# Patient Record
Sex: MALE | Race: WHITE | NOT HISPANIC OR LATINO | URBAN - METROPOLITAN AREA
[De-identification: names, ages, dates, MRNs, and addresses within clinical notes are randomized per-mention and may not be internally consistent; named-entity substitution may affect disease eponyms.]

---

## 2017-08-23 ENCOUNTER — EMERGENCY (EMERGENCY)
Facility: HOSPITAL | Age: 42
LOS: 0 days | Discharge: HOME | End: 2017-08-23
Attending: EMERGENCY MEDICINE | Admitting: EMERGENCY MEDICINE
Payer: SELF-PAY

## 2017-08-23 VITALS — WEIGHT: 195.11 LBS | HEIGHT: 73 IN

## 2017-08-23 VITALS — DIASTOLIC BLOOD PRESSURE: 74 MMHG | SYSTOLIC BLOOD PRESSURE: 121 MMHG

## 2017-08-23 DIAGNOSIS — L02.512 CUTANEOUS ABSCESS OF LEFT HAND: ICD-10-CM

## 2017-08-23 DIAGNOSIS — M79.644 PAIN IN RIGHT FINGER(S): ICD-10-CM

## 2017-08-23 DIAGNOSIS — M79.641 PAIN IN RIGHT HAND: ICD-10-CM

## 2017-08-23 PROCEDURE — 26010 DRAINAGE OF FINGER ABSCESS: CPT | Mod: F1

## 2017-08-23 PROCEDURE — 99283 EMERGENCY DEPT VISIT LOW MDM: CPT

## 2017-08-23 PROCEDURE — 99053 MED SERV 10PM-8AM 24 HR FAC: CPT

## 2017-08-23 RX ORDER — AZTREONAM 2 G
1 VIAL (EA) INJECTION
Qty: 14 | Refills: 0 | OUTPATIENT
Start: 2017-08-23 | End: 2017-08-30

## 2017-08-23 RX ORDER — CEPHALEXIN 500 MG
1 CAPSULE ORAL
Qty: 21 | Refills: 0 | OUTPATIENT
Start: 2017-08-23 | End: 2017-08-30

## 2017-08-23 RX ORDER — CEPHALEXIN 500 MG
500 CAPSULE ORAL EVERY 6 HOURS
Qty: 0 | Refills: 0 | Status: DISCONTINUED | OUTPATIENT
Start: 2017-08-23 | End: 2017-08-23

## 2017-08-23 RX ADMIN — Medication 1 TABLET(S): at 20:53

## 2017-08-23 RX ADMIN — Medication 500 MILLIGRAM(S): at 20:53

## 2017-08-23 NOTE — CONSULT NOTE ADULT - SUBJECTIVE AND OBJECTIVE BOX
42y Male community ambulatory presents c/o Left 2nd digit abscess. The patient is right hand dominant. The patient reports that he hit his left 2nd digit with a hammer last week and had some redness develop over the area which began to drain 3 days ago. Denies HS/LOC. Denies numbness/tingling. No other pain/injuries. Denies fevers/chills. The patient denies any history of MRSA.       HEALTH ISSUES - PROBLEM Dx:  psoriasis,       MEDICATIONS  (STANDING):  cephalexin 500 milliGRAM(s) Oral every 6 hours    Allergies    No Known Allergies    Intolerances    Vital Signs Last 24 Hrs  T(C): 36.8 (08-23-17 @ 20:13), Max: 36.8 (08-23-17 @ 20:13)  T(F): 98.2 (08-23-17 @ 20:13), Max: 98.2 (08-23-17 @ 20:13)  HR: 96 (08-23-17 @ 20:13) (96 - 96)  BP: 121/74 (08-23-17 @ 21:55) (121/74 - 146/82)  RR: 19 (08-23-17 @ 20:13) (19 - 19)  SpO2: 98% (08-23-17 @ 20:13) (98% - 98%)    Physical Exam  Gen: NAD  LUE: Warm/well perfused, at Dorsal PIP area of the 2nd finger there is a 5xlg5lo area of fluctuance with central eschar and small amount of purulent drainage noted. Patient sensation intact to finger tip with intact flexion/extension at DIP and PIP, FDS/FDP intact.      Procedure: L hand was prepped and draped in sterile fashion, 10cc of 1% lidocaine was used to perform a metacarpal digit block of the L 2nd finger. Abscess was incised, drained and washed out with sterile saline, a penrose drain was left in place. 1cc of purulent fluid was expressed. The wound was dressed with a sterile dressing. The patient tolerated the procedure well. NVI post-procedure.

## 2017-08-23 NOTE — ED STATDOCS - PHYSICAL EXAMINATION
GEN: AOX3, NAD. HEENT: Throat clear. Head NC/AT. NECK: Supple, No JVD. FROM. C-spine non-tender. CV:S1S2, RRR, LUNGS: CTA/b/l, no w/r/r. ABD: Soft, NT/ND, no rebound, no guarding. No CVAT. EXT: No e/c/c. 2+ distal pulses. LEFT INDEX FINGER: +Localized STS/erythema/fluctuance noted dorsal aspect of PIP area of left 2nd digit. Mild tenderness. Mild pain with FROM left 2nd digit. NVI. Cap refill less than 2 sec. No tendon deficit. SKIN: No rashes. NEURO: No focal deficits. CN II-XII intact. FROM. 5/5 motor and sensory. MILADY Morrison

## 2017-08-23 NOTE — CONSULT NOTE ADULT - ASSESSMENT
A/P: 42y Male with L 2nd digit abscess  Pain control  Ice/elevate as needed  Keep dressing clean and dry  Antibiotics per ER physician   Removed dressing on Friday 8/25/17 and remove penrose drain, run under warm tap water 3 times daily for 15minutes while palpating finger to express fluid.   All question answered  Follow up with Dr. Townsend as outpatient in 1 week, call office for appointment   Ortho stable for discharge A/P: 42y Male with L 2nd digit abscess s/p I&D in the ED  Pain control  Ice/elevate as needed  Keep dressing clean and dry  Antibiotics per ER physician   Removed dressing on Friday 8/25/17 and remove penrose drain, run under warm tap water 3 times daily for 15minutes while palpating finger to express fluid.   All question answered  Follow up with Dr. Townsend as outpatient in 1 week, call office for appointment   Ortho stable for discharge

## 2017-08-23 NOTE — ED STATDOCS - PROGRESS NOTE DETAILS
I&D done by Dr. Townsend. Patient is feeling much better, tests/labs reviewed. case discussed with attending. OK to dc home. MILADY Morrison

## 2017-08-23 NOTE — ED STATDOCS - ATTENDING CONTRIBUTION TO CARE
Attending Contribution to Care: I, Kalina Salcido, performed the initial face to face bedside interview with this patient regarding history of present illness, review of symptoms and relevant past medical, social and family history.  I completed an independent physical examination.  I was the initial provider who evaluated this patient and the history, physical, and MDM reflect this intial assessment. I have signed out the follow up of any pending tests after the original (i.e. labs, radiological studies) to the ACP with instructions to review any with instructions to review any concerning findings to me prior to discharge.  I have communicated the patient’s plan of care and disposition with the ACP.

## 2017-08-23 NOTE — ED ADULT NURSE NOTE - OBJECTIVE STATEMENT
pt presents to ED c/o right index finger pain/swelling from injury with hammer last week at work. pt denies fevers.

## 2017-08-23 NOTE — ED STATDOCS - OBJECTIVE STATEMENT
43 y/o male with PMHx of psoriasis, cellulitis of the leg (requiring admission) presents to the ED c/o left hand pain x 1 week, with swelling developed today. Pt is right hand dominant. No hx of MRSA. NKDA.

## 2022-04-12 NOTE — ED ADULT NURSE NOTE - NS ED NURSE RECORD ANOTHER VITAL SIGN
CC:  Brandy De Leon is here today for IUD check.    Medications: currently is not taking any medications  Refills needed today?  NO  Pharmacy: [facility]  Patient would like communication of their results via:    Cell Phone:   Telephone Information:   Mobile [phone]     Okay to leave a message containing results? Yes     Having pain on lower abdomen and vaginal area and hurts more when laying down.    If your visit includes an exam today, would you like an assistant to be present in the room during that time? NO           Yes